# Patient Record
Sex: FEMALE | Race: WHITE | NOT HISPANIC OR LATINO | URBAN - METROPOLITAN AREA
[De-identification: names, ages, dates, MRNs, and addresses within clinical notes are randomized per-mention and may not be internally consistent; named-entity substitution may affect disease eponyms.]

---

## 2023-04-17 ENCOUNTER — OFFICE VISIT (OUTPATIENT)
Dept: URBAN - METROPOLITAN AREA CLINIC 24 | Facility: CLINIC | Age: 68
End: 2023-04-17
Payer: MEDICARE

## 2023-04-17 DIAGNOSIS — H17.822 PERIPHERAL OPACITY OF CORNEA, LEFT EYE: ICD-10-CM

## 2023-04-17 DIAGNOSIS — H40.053 OCULAR HYPERTENSION, BILATERAL: ICD-10-CM

## 2023-04-17 DIAGNOSIS — Z98.890 OTHER SPECIFIED POSTPROCEDURAL STATES: ICD-10-CM

## 2023-04-17 DIAGNOSIS — H43.813 VITREOUS DEGENERATION, BILATERAL: ICD-10-CM

## 2023-04-17 PROCEDURE — 92025 CPTRIZED CORNEAL TOPOGRAPHY: CPT | Performed by: OPTOMETRIST

## 2023-04-17 PROCEDURE — 92133 CPTRZD OPH DX IMG PST SGM ON: CPT | Performed by: OPTOMETRIST

## 2023-04-17 PROCEDURE — 99204 OFFICE O/P NEW MOD 45 MIN: CPT | Performed by: OPTOMETRIST

## 2023-04-17 PROCEDURE — 76514 ECHO EXAM OF EYE THICKNESS: CPT | Performed by: OPTOMETRIST

## 2023-04-17 RX ORDER — LOSARTAN POTASSIUM 100 MG/1
100 MG TABLET, FILM COATED ORAL
Qty: 0 | Refills: 0 | Status: ACTIVE
Start: 2023-04-17

## 2023-04-17 ASSESSMENT — VISUAL ACUITY
OS: 20/20
OD: 20/20

## 2023-04-17 ASSESSMENT — INTRAOCULAR PRESSURE
OD: 27
OS: 23
OD: 26
OS: 21

## 2023-04-17 ASSESSMENT — KERATOMETRY
OS: 40.75
OD: 40.45

## 2023-04-17 NOTE — IMPRESSION/PLAN
Impression: Other specified postprocedural states: Z98.890.
-- s/p lasik ou Plan: Again, implications with C&I discussed.

## 2023-04-17 NOTE — IMPRESSION/PLAN
Impression: Ocular hypertension, bilateral: H40.053.
-- s/p LPI OU; 2007
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 Will defer IOP gtts for now; however schedule HVF 24-2 prior to surgery.

## 2023-04-17 NOTE — IMPRESSION/PLAN
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
-- epi cells Plan: Baseline maximiliano today.

## 2023-04-17 NOTE — IMPRESSION/PLAN
Impression: Vitreous degeneration, bilateral: H43.813. Plan: There is no evidence of retinal pathology. Rd precautions. 
-- pt advised floaters will persist following surgery

## 2023-04-17 NOTE — IMPRESSION/PLAN
Impression: Combined forms of age-related cataract, bilateral: H25.813. Plan: Cataract accounts for patient's complaint. Discussed treatment options. Surgical treatment is recommended. Surgical risk and benefits discussed. Patient elects surgical treatment, OU OD first.  Pt is a candidate for multifocal, toric, standard, LenSx and ORA. AIM: [employer]ees -- pt is s/p lasik ou (epi cells OS); implications with C&I discussed -- pt reports prefers to wear glasses after surgery (transitions, etc).

## 2023-04-20 ENCOUNTER — ADULT PHYSICAL (OUTPATIENT)
Dept: URBAN - METROPOLITAN AREA CLINIC 24 | Facility: CLINIC | Age: 68
End: 2023-04-20
Payer: MEDICARE

## 2023-04-20 DIAGNOSIS — H25.813 COMBINED FORMS OF AGE-RELATED CATARACT, BILATERAL: ICD-10-CM

## 2023-04-20 DIAGNOSIS — Z01.818 ENCOUNTER FOR OTHER PREPROCEDURAL EXAMINATION: Primary | ICD-10-CM

## 2023-04-20 PROCEDURE — 99202 OFFICE O/P NEW SF 15 MIN: CPT | Performed by: PHYSICIAN ASSISTANT

## 2023-04-25 ENCOUNTER — PRE-OPERATIVE VISIT (OUTPATIENT)
Dept: URBAN - METROPOLITAN AREA CLINIC 26 | Facility: CLINIC | Age: 68
End: 2023-04-25
Payer: MEDICARE

## 2023-04-25 DIAGNOSIS — H17.822 PERIPHERAL OPACITY OF CORNEA, LEFT EYE: ICD-10-CM

## 2023-04-25 DIAGNOSIS — H40.053 OCULAR HYPERTENSION, BILATERAL: ICD-10-CM

## 2023-04-25 DIAGNOSIS — Z98.890 OTHER SPECIFIED POSTPROCEDURAL STATES: ICD-10-CM

## 2023-04-25 DIAGNOSIS — I69.398 OTHER SEQUELAE OF CEREBRAL INFARCTION: ICD-10-CM

## 2023-04-25 DIAGNOSIS — H52.202 ASTIGMATISM OF LEFT EYE: ICD-10-CM

## 2023-04-25 DIAGNOSIS — H43.813 VITREOUS DEGENERATION, BILATERAL: ICD-10-CM

## 2023-04-25 DIAGNOSIS — H25.813 COMBINED FORMS OF AGE-RELATED CATARACT, BILATERAL: Primary | ICD-10-CM

## 2023-04-25 PROCEDURE — 99204 OFFICE O/P NEW MOD 45 MIN: CPT | Performed by: OPHTHALMOLOGY

## 2023-04-25 PROCEDURE — 92083 EXTENDED VISUAL FIELD XM: CPT | Performed by: OPHTHALMOLOGY

## 2023-04-25 ASSESSMENT — INTRAOCULAR PRESSURE
OS: 18
OD: 20

## 2023-04-25 NOTE — IMPRESSION/PLAN
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 Plan: Hx TIA. VFT full ou. Discussed with patient, understands this may limit vision after surgery.

## 2023-04-25 NOTE — IMPRESSION/PLAN
Impression: Ocular hypertension, bilateral: H40.053.
-- s/p LPI OU; 2007
-- Pachs OD: 566 OS: 544 Plan: LPI patent OU. Borderline glaucoma, intraocular pressure stable without medication. Continue without medication and observe. Discussed with patient, understands this may limit vision after surgery.

## 2023-04-25 NOTE — IMPRESSION/PLAN
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
-- epi cells Plan: Monitor. Discussed with patient, understands this may limit vision after surgery.

## 2023-04-25 NOTE — IMPRESSION/PLAN
Impression: Combined forms of age-related cataract, bilateral: H25.813. Plan: *** CXL , Pt will HOLD OFF ON SX FOR NOW***  
RTC 6 months - 1 year to Re-evaluate and see if Cataract Sx needed Pt main complaint is tearing OD, has been told in the past she has NLDO OD and Possibly OS - will recommend Consult with Oculoplstics N/A. Discussed cataract diagnosis with the patient. Discussed risks, benefits and alternatives to surgery including but not limited to: bleeding, infection, risk of vision loss, loss of the eye, need for other surgery. Patient voiced understanding and wishes to proceed. Patient elects surgical treatment. Advanced technology options Discussed with patient . Patient desires surgery OU, OS  first (( AIM -0.25 OU, injectable  1st choice/TRI MOXI, Topical anesthesia, NO ORA OU, LRI-declined AMP, hx ocular htn , s/p LASIK ou, s/p LPI ou, Hx of Hashimotos and Hx of TIA )) Patient understands the need for glasses after surgery for BCVA. HVF done and reviewed today- WNL OU.  s/p LASIK OU. Makes the final outcome less predictable discussed may need 50/50 rotation and enhancement.

## 2023-04-25 NOTE — IMPRESSION/PLAN
Impression: Other specified postprocedural states: Z98.890.
-- s/p lasik ou Plan: s/p LASIK OU. Makes the final outcome less predictable discussed may need 50/50 rotation and enhancement.

## 2023-10-05 ENCOUNTER — OFFICE VISIT (OUTPATIENT)
Dept: URBAN - METROPOLITAN AREA CLINIC 24 | Facility: CLINIC | Age: 68
End: 2023-10-05
Payer: MEDICARE

## 2023-10-05 DIAGNOSIS — H04.551 ACQUIRED STENOSIS OF RIGHT NASOLACRIMAL DUCT: Primary | ICD-10-CM

## 2023-10-05 PROCEDURE — 31231 NASAL ENDOSCOPY DX: CPT | Performed by: OPHTHALMOLOGY

## 2023-10-05 PROCEDURE — 68810 PROBE NASOLACRIMAL DUCT: CPT | Performed by: OPHTHALMOLOGY

## 2023-10-05 PROCEDURE — 99213 OFFICE O/P EST LOW 20 MIN: CPT | Performed by: OPHTHALMOLOGY
